# Patient Record
Sex: FEMALE | Race: WHITE | NOT HISPANIC OR LATINO | Employment: FULL TIME | URBAN - NONMETROPOLITAN AREA
[De-identification: names, ages, dates, MRNs, and addresses within clinical notes are randomized per-mention and may not be internally consistent; named-entity substitution may affect disease eponyms.]

---

## 2023-05-04 ENCOUNTER — APPOINTMENT (OUTPATIENT)
Dept: PRIMARY CARE | Facility: CLINIC | Age: 53
End: 2023-05-04
Payer: COMMERCIAL

## 2023-05-09 DIAGNOSIS — G43.919 INTRACTABLE MIGRAINE WITHOUT STATUS MIGRAINOSUS, UNSPECIFIED MIGRAINE TYPE: ICD-10-CM

## 2023-05-09 DIAGNOSIS — S46.912D STRAIN OF LEFT SHOULDER, SUBSEQUENT ENCOUNTER: ICD-10-CM

## 2023-05-09 RX ORDER — TOPIRAMATE 50 MG/1
50 TABLET, FILM COATED ORAL 2 TIMES DAILY
Qty: 180 TABLET | Refills: 3 | Status: SHIPPED | OUTPATIENT
Start: 2023-05-09 | End: 2023-06-09 | Stop reason: SDUPTHER

## 2023-05-09 RX ORDER — TOPIRAMATE 50 MG/1
50 TABLET, FILM COATED ORAL 2 TIMES DAILY
COMMUNITY
Start: 2020-01-30 | End: 2023-05-09 | Stop reason: SDUPTHER

## 2023-05-09 RX ORDER — MELOXICAM 15 MG/1
15 TABLET ORAL DAILY
Qty: 30 TABLET | Refills: 1 | Status: SHIPPED | OUTPATIENT
Start: 2023-05-09 | End: 2023-07-13 | Stop reason: SDUPTHER

## 2023-05-09 RX ORDER — MELOXICAM 15 MG/1
15 TABLET ORAL DAILY
COMMUNITY
Start: 2023-03-26 | End: 2023-05-09 | Stop reason: SDUPTHER

## 2023-05-23 RX ORDER — ASPIRIN 81 MG/81MG
81 CAPSULE ORAL
COMMUNITY

## 2023-05-23 RX ORDER — DICLOFENAC SODIUM 10 MG/G
4 GEL TOPICAL 4 TIMES DAILY PRN
COMMUNITY
Start: 2023-03-03

## 2023-05-23 RX ORDER — BUPROPION HYDROCHLORIDE 300 MG/1
300 TABLET ORAL DAILY
COMMUNITY
End: 2023-07-17 | Stop reason: SDUPTHER

## 2023-05-23 RX ORDER — ALBUTEROL SULFATE 90 UG/1
2 AEROSOL, METERED RESPIRATORY (INHALATION) EVERY 6 HOURS PRN
COMMUNITY

## 2023-05-23 RX ORDER — MONTELUKAST SODIUM 10 MG/1
10 TABLET ORAL NIGHTLY
COMMUNITY
End: 2023-10-23 | Stop reason: SDUPTHER

## 2023-05-23 RX ORDER — SUMATRIPTAN SUCCINATE 25 MG/1
25 TABLET ORAL
COMMUNITY
Start: 2021-05-05 | End: 2023-06-09

## 2023-05-23 RX ORDER — ONDANSETRON 4 MG/1
4 TABLET, FILM COATED ORAL 4 TIMES DAILY
COMMUNITY
Start: 2020-10-22

## 2023-05-23 RX ORDER — RILUZOLE 50 MG/1
50 TABLET, FILM COATED ORAL 2 TIMES DAILY
COMMUNITY

## 2023-05-23 RX ORDER — FLUOXETINE HYDROCHLORIDE 40 MG/1
40 CAPSULE ORAL DAILY
COMMUNITY
End: 2023-10-23 | Stop reason: SDUPTHER

## 2023-05-25 ENCOUNTER — APPOINTMENT (OUTPATIENT)
Dept: PRIMARY CARE | Facility: CLINIC | Age: 53
End: 2023-05-25
Payer: COMMERCIAL

## 2023-06-06 ENCOUNTER — TELEPHONE (OUTPATIENT)
Dept: PRIMARY CARE | Facility: CLINIC | Age: 53
End: 2023-06-06
Payer: COMMERCIAL

## 2023-06-06 NOTE — TELEPHONE ENCOUNTER
stating patient has been vomiting for the last 10 hours, shaking very bad. Asking for a call back.

## 2023-06-09 ENCOUNTER — TELEMEDICINE (OUTPATIENT)
Dept: PRIMARY CARE | Facility: CLINIC | Age: 53
End: 2023-06-09
Payer: COMMERCIAL

## 2023-06-09 DIAGNOSIS — G89.29 CHRONIC LEFT SHOULDER PAIN: Primary | ICD-10-CM

## 2023-06-09 DIAGNOSIS — J30.89 ENVIRONMENTAL AND SEASONAL ALLERGIES: ICD-10-CM

## 2023-06-09 DIAGNOSIS — G12.21 ALS (AMYOTROPHIC LATERAL SCLEROSIS) (MULTI): ICD-10-CM

## 2023-06-09 DIAGNOSIS — M25.512 CHRONIC LEFT SHOULDER PAIN: Primary | ICD-10-CM

## 2023-06-09 DIAGNOSIS — G43.919 INTRACTABLE MIGRAINE WITHOUT STATUS MIGRAINOSUS, UNSPECIFIED MIGRAINE TYPE: ICD-10-CM

## 2023-06-09 DIAGNOSIS — F32.A DEPRESSION, UNSPECIFIED DEPRESSION TYPE: ICD-10-CM

## 2023-06-09 DIAGNOSIS — F41.9 ANXIETY: ICD-10-CM

## 2023-06-09 PROCEDURE — 99443 PR PHYS/QHP TELEPHONE EVALUATION 21-30 MIN: CPT

## 2023-06-09 RX ORDER — TIZANIDINE 4 MG/1
4 TABLET ORAL EVERY 8 HOURS PRN
Qty: 90 TABLET | Refills: 2 | Status: SHIPPED | OUTPATIENT
Start: 2023-06-09 | End: 2023-10-23 | Stop reason: SDUPTHER

## 2023-06-09 RX ORDER — SUMATRIPTAN 50 MG/1
50 TABLET, FILM COATED ORAL ONCE AS NEEDED
Qty: 27 TABLET | Refills: 3 | Status: SHIPPED | OUTPATIENT
Start: 2023-06-09 | End: 2024-06-08

## 2023-06-09 RX ORDER — TOPIRAMATE 50 MG/1
150 TABLET, FILM COATED ORAL 2 TIMES DAILY
Qty: 540 TABLET | Refills: 3 | Status: SHIPPED | OUTPATIENT
Start: 2023-06-09 | End: 2024-06-08

## 2023-06-09 ASSESSMENT — ENCOUNTER SYMPTOMS
CARDIOVASCULAR NEGATIVE: 1
GASTROINTESTINAL NEGATIVE: 1
RESPIRATORY NEGATIVE: 1
CONSTITUTIONAL NEGATIVE: 1

## 2023-06-09 NOTE — PROGRESS NOTES
Subjective   Patient ID: Nusrat Christopher is a 53 y.o. female who presents via telephone for 2 mth ov.    HPI   ER visit 4 days ago for nausea/abdominal pain/vomiting. Symptoms slowly resolving now, she has Zofran prn.  MIGRAINES: Topomax 100mg daily no longer effective at preventing migraines, sumatriptan 25mg only mildly effective. She is getting 2-3 migraines weekly.  ALS: Recently diagnosed this year. Follows with Dr. Dong at [facility]. Next appointment in about 1 month. Started Rilutek 3 months.  ALLERGIES: Singulair effective, no SE.   DEPRESSION/ANXIETY: Moods okay given new dx of ALS, medications okay, no SE.  L SHOULDER PAIN: Taking tizanidine 4mg BID. Still with daily shoulder pain, difficult to move d/t pain, possible frozen shoulder per her son who is massage therapist. She has referral for PT, but difficult to get there d/t ALS, Dr. Dong is working on OT to come to her home, but is proving hard to find OT specific to ALS.      Review of Systems   Constitutional: Negative.    Respiratory: Negative.     Cardiovascular: Negative.    Gastrointestinal: Negative.        Objective   There were no vitals taken for this visit.    Physical Exam  Unable to perform d/t nature of visit.    Assessment/Plan        Increase Topamax to 150mg daily, increase sumatriptan to 50mg and then repeat in 2 hours prn.  Increase tizanidine to 4mg TID.  Advised to consult Dr. Dong in 1 month concerning migraines and shoulder pain in light of ALS.  We will follow up in little over 1 month after she sees Dr. Dong.

## 2023-06-27 ENCOUNTER — APPOINTMENT (OUTPATIENT)
Dept: PRIMARY CARE | Facility: CLINIC | Age: 53
End: 2023-06-27
Payer: COMMERCIAL

## 2023-07-13 DIAGNOSIS — R42 DIZZINESS: Primary | ICD-10-CM

## 2023-07-13 DIAGNOSIS — S46.912D STRAIN OF LEFT SHOULDER, SUBSEQUENT ENCOUNTER: ICD-10-CM

## 2023-07-13 RX ORDER — MECLIZINE HCL 12.5 MG 12.5 MG/1
12.5 TABLET ORAL 4 TIMES DAILY PRN
Qty: 120 TABLET | Refills: 1 | Status: SHIPPED | OUTPATIENT
Start: 2023-07-13 | End: 2023-09-11

## 2023-07-13 RX ORDER — MELOXICAM 15 MG/1
15 TABLET ORAL DAILY
Qty: 30 TABLET | Refills: 1 | Status: SHIPPED | OUTPATIENT
Start: 2023-07-13 | End: 2023-09-15 | Stop reason: SDUPTHER

## 2023-07-17 ENCOUNTER — TELEPHONE (OUTPATIENT)
Dept: PRIMARY CARE | Facility: CLINIC | Age: 53
End: 2023-07-17
Payer: COMMERCIAL

## 2023-07-17 DIAGNOSIS — F32.A DEPRESSION, UNSPECIFIED DEPRESSION TYPE: ICD-10-CM

## 2023-07-17 RX ORDER — BUPROPION HYDROCHLORIDE 300 MG/1
300 TABLET ORAL DAILY
Qty: 90 TABLET | Refills: 3 | Status: SHIPPED | OUTPATIENT
Start: 2023-07-17 | End: 2024-07-16

## 2023-07-17 NOTE — TELEPHONE ENCOUNTER
Pharmacy called and would like to know if the Bupropion needs to be IRMA or can they use a generic?

## 2023-09-15 DIAGNOSIS — S46.912D STRAIN OF LEFT SHOULDER, SUBSEQUENT ENCOUNTER: ICD-10-CM

## 2023-09-15 RX ORDER — MELOXICAM 15 MG/1
15 TABLET ORAL DAILY
Qty: 30 TABLET | Refills: 1 | Status: SHIPPED | OUTPATIENT
Start: 2023-09-15 | End: 2023-09-29 | Stop reason: SDUPTHER

## 2023-09-28 ENCOUNTER — TELEPHONE (OUTPATIENT)
Dept: PRIMARY CARE | Facility: CLINIC | Age: 53
End: 2023-09-28
Payer: COMMERCIAL

## 2023-09-28 NOTE — TELEPHONE ENCOUNTER
DM pharmacy needing clarification of meloxicam medication. Was written as generic but is marked as IRMA. Can call [phone]

## 2023-09-29 DIAGNOSIS — S46.912D STRAIN OF LEFT SHOULDER, SUBSEQUENT ENCOUNTER: ICD-10-CM

## 2023-09-29 RX ORDER — MELOXICAM 15 MG/1
15 TABLET ORAL DAILY
Qty: 30 TABLET | Refills: 2 | Status: SHIPPED | OUTPATIENT
Start: 2023-09-29 | End: 2023-12-28

## 2023-10-01 PROBLEM — R29.898 WEAKNESS OF LEFT ARM: Status: ACTIVE | Noted: 2023-10-01

## 2023-10-01 PROBLEM — R74.8 ELEVATED LIVER ENZYMES: Status: ACTIVE | Noted: 2023-10-01

## 2023-10-01 PROBLEM — J30.9 ALLERGIC RHINITIS: Status: ACTIVE | Noted: 2023-10-01

## 2023-10-01 PROBLEM — R47.81 DEFICIT IN COMMUNICATION DUE TO SLURRED SPEECH: Status: ACTIVE | Noted: 2023-10-01

## 2023-10-01 PROBLEM — K76.0 FATTY LIVER: Status: ACTIVE | Noted: 2023-10-01

## 2023-10-01 PROBLEM — G11.8 EPISODIC ATAXIA WITH SLURRED SPEECH (MULTI): Status: ACTIVE | Noted: 2023-10-01

## 2023-10-01 PROBLEM — R47.81 EPISODIC ATAXIA WITH SLURRED SPEECH (MULTI): Status: ACTIVE | Noted: 2023-10-01

## 2023-10-01 PROBLEM — G43.909 MIGRAINE: Status: ACTIVE | Noted: 2023-10-01

## 2023-10-01 PROBLEM — N32.81 OVERACTIVE BLADDER: Status: ACTIVE | Noted: 2023-10-01

## 2023-10-01 PROBLEM — E66.01 MORBID OBESITY (MULTI): Status: ACTIVE | Noted: 2023-10-01

## 2023-10-01 PROBLEM — R06.02 SHORTNESS OF BREATH: Status: ACTIVE | Noted: 2023-10-01

## 2023-10-01 PROBLEM — R51.9 MIXED HEADACHE: Status: ACTIVE | Noted: 2023-10-01

## 2023-10-01 PROBLEM — R13.10 DYSPHAGIA: Status: ACTIVE | Noted: 2023-10-01

## 2023-10-01 PROBLEM — E66.9 OBESITY, CLASS II, BMI 35-39.9: Status: ACTIVE | Noted: 2023-01-24

## 2023-10-01 PROBLEM — E66.9 OBESITY: Status: ACTIVE | Noted: 2023-10-01

## 2023-10-01 PROBLEM — J45.909 ASTHMA (HHS-HCC): Status: ACTIVE | Noted: 2023-10-01

## 2023-10-01 PROBLEM — E66.812 OBESITY, CLASS II, BMI 35-39.9: Status: ACTIVE | Noted: 2023-01-24

## 2023-10-01 PROBLEM — G47.33 OBSTRUCTIVE SLEEP APNEA: Status: ACTIVE | Noted: 2023-10-01

## 2023-10-01 PROBLEM — H69.90 EUSTACHIAN TUBE DYSFUNCTION: Status: ACTIVE | Noted: 2023-10-01

## 2023-10-01 RX ORDER — IBUPROFEN 600 MG/1
TABLET ORAL
COMMUNITY

## 2023-10-01 RX ORDER — CLINDAMYCIN HYDROCHLORIDE 150 MG/1
300 CAPSULE ORAL 4 TIMES DAILY
COMMUNITY

## 2023-10-01 RX ORDER — CETIRIZINE HYDROCHLORIDE 10 MG/1
10 TABLET ORAL
COMMUNITY

## 2023-10-01 RX ORDER — OMEPRAZOLE 20 MG/1
20 CAPSULE, DELAYED RELEASE ORAL
COMMUNITY

## 2023-10-01 RX ORDER — OXYCODONE AND ACETAMINOPHEN 5; 325 MG/1; MG/1
1 TABLET ORAL EVERY 6 HOURS
COMMUNITY
Start: 2023-06-24

## 2023-10-01 RX ORDER — CLOTRIMAZOLE AND BETAMETHASONE DIPROPIONATE 10; .64 MG/G; MG/G
1 CREAM TOPICAL 2 TIMES DAILY
COMMUNITY
Start: 2013-10-16

## 2023-10-01 RX ORDER — TIZANIDINE 2 MG/1
2 TABLET ORAL 2 TIMES DAILY
COMMUNITY
Start: 2023-04-27 | End: 2024-01-25 | Stop reason: ALTCHOICE

## 2023-10-01 RX ORDER — ONDANSETRON 4 MG/1
TABLET, ORALLY DISINTEGRATING ORAL
COMMUNITY
Start: 2023-06-06

## 2023-10-01 RX ORDER — KETOTIFEN FUMARATE 0.35 MG/ML
2 SOLUTION/ DROPS OPHTHALMIC
COMMUNITY

## 2023-10-01 RX ORDER — FLUOXETINE HYDROCHLORIDE 20 MG/1
20 CAPSULE ORAL DAILY
COMMUNITY
Start: 2023-02-06

## 2023-10-01 RX ORDER — ZOLPIDEM TARTRATE 5 MG/1
5 TABLET ORAL DAILY PRN
COMMUNITY
Start: 2013-10-23

## 2023-10-01 RX ORDER — SODIUM PHENYLBUTYRATE/TAURURSODIOL 3; 1 G/1; G/1
POWDER, FOR SUSPENSION ORAL
COMMUNITY
Start: 2023-04-13 | End: 2024-05-03

## 2023-10-01 RX ORDER — MULTIVITAMIN
TABLET ORAL
COMMUNITY

## 2023-10-01 RX ORDER — ONDANSETRON 8 MG/1
TABLET, ORALLY DISINTEGRATING ORAL
COMMUNITY
Start: 2023-06-24

## 2023-10-01 RX ORDER — TRAMADOL HYDROCHLORIDE 50 MG/1
50 TABLET ORAL EVERY 6 HOURS PRN
COMMUNITY

## 2023-10-01 RX ORDER — SUMATRIPTAN SUCCINATE 25 MG/1
TABLET ORAL
COMMUNITY

## 2023-10-03 ENCOUNTER — OFFICE VISIT (OUTPATIENT)
Dept: ORTHOPEDIC SURGERY | Facility: CLINIC | Age: 53
End: 2023-10-03
Payer: COMMERCIAL

## 2023-10-03 DIAGNOSIS — M25.512 BILATERAL SHOULDER PAIN, UNSPECIFIED CHRONICITY: Primary | ICD-10-CM

## 2023-10-03 DIAGNOSIS — R22.2 MASS OF CHEST WALL: ICD-10-CM

## 2023-10-03 DIAGNOSIS — M25.511 BILATERAL SHOULDER PAIN, UNSPECIFIED CHRONICITY: Primary | ICD-10-CM

## 2023-10-03 PROCEDURE — 99204 OFFICE O/P NEW MOD 45 MIN: CPT | Performed by: NURSE PRACTITIONER

## 2023-10-03 PROCEDURE — 1036F TOBACCO NON-USER: CPT | Performed by: NURSE PRACTITIONER

## 2023-10-03 ASSESSMENT — PAIN DESCRIPTION - DESCRIPTORS: DESCRIPTORS: ACHING

## 2023-10-03 ASSESSMENT — PAIN SCALES - GENERAL: PAINLEVEL_OUTOF10: 6

## 2023-10-03 ASSESSMENT — PAIN - FUNCTIONAL ASSESSMENT: PAIN_FUNCTIONAL_ASSESSMENT: 0-10

## 2023-10-03 NOTE — LETTER
October 5, 2023     Arley Draper PA-C  [address]  [facility], [facility] [address]  [place] [id number]    Patient: Nusrat Christopher   YOB: 1970   Date of Visit: 10/3/2023       Dear Dr. Arley Draper PA-C:    Thank you for referring Nusrat Christopher to me for evaluation. Below are my notes for this consultation.  If you have questions, please do not hesitate to call me. I look forward to following your patient along with you.       Sincerely,     Silvia Marshall, APRN-CNP      CC: No Recipients  ______________________________________________________________________________________    Subjective   Patient ID: Nusrat Christopher is a 53 y.o. female.    Chief Complaint: Immobility of the Left Shoulder and Immobility of the Right Shoulder    Left Shoulder     Right Shoulder         Nusrat is a pleasant 53-year-old female presenting as a new patient visit for evaluation of bilateral shoulder stiffness and concern for frozen shoulders.  Patient reports the left shoulder has been progressing since January of this year, initially the symptoms were attributed to her ALS and progression.  She has had no treatment.  Significant limited range of motion of the shoulder and all joints of the left upper arm.  The right shoulder is not as bad but is stiff with limited range of motion.  Patient is right-hand dominant.  No therapy for these conditions.  Patient did attempt some Voltaren gel to the left shoulder with no symptom improvement.  Patient is accompanied by her spouse and son-in-law.  Spouse assists with interpretation of speech as well as the primary caregiver at home.  Spouse is also disabled due to multiple orthopedic injuries.    Review of Systems   Constitutional:         Alert and pleasant, well-nourished female presenting today in wheelchair.  Patient does have difficulty with speech and noted atrophy of the left upper extremity.   Respiratory: Negative.     Cardiovascular: Negative.    Gastrointestinal:  Negative.    Musculoskeletal:  Positive for arthralgias, gait problem and myalgias.   Skin: Negative.    Neurological:         + for ALS    Psychiatric/Behavioral: Negative.            Objective  Right Shoulder Exam   Right shoulder exam is normal.    Range of Motion   Active abduction:  70   Forward flexion:  80     Muscle Strength   Right shoulder normal muscle strength: mild generalized weakness RUE.    Tests   Johnson test: negative  Impingement: negative  Drop arm: negative  Sulcus: absent    Other   Erythema: absent  Sensation: normal  Pulse: present      Left Shoulder Exam     Tenderness   Left shoulder tenderness location: generalized shoulder- anterior and superior aspects.    Range of Motion   Active abduction:  20   Forward flexion:  30     Muscle Strength   Left shoulder normal muscle strength: moderate LUE weakness, significant weakness at rotator cuff.    Other   Erythema: absent  Scars: absent  Sensation: normal  Pulse: present     Comments:  There is a palpable lump just distal to the AC joint to the lateral rib region approximately 2 inches x 2 inches.  This is firm and tender to touch, nonmobile.  Patient states this is the focal point of her pain.            Image Results:  Patient has not had shoulder imaging.  She was unaware of orders pending for today's visit.    Assessment/Plan  Encounter Diagnoses:    Mass of chest wall  Discussed findings of the lump near the left shoulder, I am referring to general surgery for further evaluation    Bilateral shoulder pain  We discussed topical diclofenac gel to bilateral shoulders, anticipate it should provide some relief to the right shoulder and may use up to 4 times daily.  We reviewed risk and benefits of cortisone injection.  What would serve for symptom control but also diagnostic.  I am recommending PT for muscle weakness.  Patient and spouse state they have financial concerns about being able to afford therapy.  During decision making process,  patient states she is going for a feeding tube next week.  At this time I do believe we should hold off on the cortisone injection until she is healing from that procedure.  Estimate about 2 to 3 weeks for follow-up.  I instructed patient family to have x-rays completed before that appointment and plan will be for cortisone injection of the right shoulder with ultrasound guidance here in the office.  Referring patient for general surgery eval of the left anterior chest lump, also to discuss with PCP.  Patient and family in agreement with plan of care.  This note was generated using Dragon software.  It may contain errors in wording, punctuation or spelling.     Mass of chest wall  Discussed findings of the lump near the left shoulder, I am referring to general surgery for further evaluation     Orders Placed This Encounter   • XR shoulder 2+ views bilateral   • Referral to General Surgery

## 2023-10-05 PROBLEM — R22.2 MASS OF CHEST WALL: Status: ACTIVE | Noted: 2023-10-05

## 2023-10-05 PROBLEM — M25.512 BILATERAL SHOULDER PAIN: Status: ACTIVE | Noted: 2023-10-05

## 2023-10-05 PROBLEM — M25.511 BILATERAL SHOULDER PAIN: Status: ACTIVE | Noted: 2023-10-05

## 2023-10-05 ASSESSMENT — ENCOUNTER SYMPTOMS
RESPIRATORY NEGATIVE: 1
CARDIOVASCULAR NEGATIVE: 1
GASTROINTESTINAL NEGATIVE: 1
MYALGIAS: 1
PSYCHIATRIC NEGATIVE: 1
ARTHRALGIAS: 1

## 2023-10-05 NOTE — ASSESSMENT & PLAN NOTE
Discussed findings of the lump near the left shoulder, I am referring to general surgery for further evaluation

## 2023-10-05 NOTE — PROGRESS NOTES
Subjective    Patient ID: Nusrat Christopher is a 53 y.o. female.    Chief Complaint: Immobility of the Left Shoulder and Immobility of the Right Shoulder    Left Shoulder     Right Shoulder         Nusrat is a pleasant 53-year-old female presenting as a new patient visit for evaluation of bilateral shoulder stiffness and concern for frozen shoulders.  Patient reports the left shoulder has been progressing since January of this year, initially the symptoms were attributed to her ALS and progression.  She has had no treatment.  Significant limited range of motion of the shoulder and all joints of the left upper arm.  The right shoulder is not as bad but is stiff with limited range of motion.  Patient is right-hand dominant.  No therapy for these conditions.  Patient did attempt some Voltaren gel to the left shoulder with no symptom improvement.  Patient is accompanied by her spouse and son-in-law.  Spouse assists with interpretation of speech as well as the primary caregiver at home.  Spouse is also disabled due to multiple orthopedic injuries.    Review of Systems   Constitutional:         Alert and pleasant, well-nourished female presenting today in wheelchair.  Patient does have difficulty with speech and noted atrophy of the left upper extremity.   Respiratory: Negative.     Cardiovascular: Negative.    Gastrointestinal: Negative.    Musculoskeletal:  Positive for arthralgias, gait problem and myalgias.   Skin: Negative.    Neurological:         + for ALS    Psychiatric/Behavioral: Negative.            Objective   Right Shoulder Exam   Right shoulder exam is normal.    Range of Motion   Active abduction:  70   Forward flexion:  80     Muscle Strength   Right shoulder normal muscle strength: mild generalized weakness RUE.    Tests   Johnson test: negative  Impingement: negative  Drop arm: negative  Sulcus: absent    Other   Erythema: absent  Sensation: normal  Pulse: present      Left Shoulder Exam     Tenderness   Left  shoulder tenderness location: generalized shoulder- anterior and superior aspects.    Range of Motion   Active abduction:  20   Forward flexion:  30     Muscle Strength   Left shoulder normal muscle strength: moderate LUE weakness, significant weakness at rotator cuff.    Other   Erythema: absent  Scars: absent  Sensation: normal  Pulse: present     Comments:  There is a palpable lump just distal to the AC joint to the lateral rib region approximately 2 inches x 2 inches.  This is firm and tender to touch, nonmobile.  Patient states this is the focal point of her pain.            Image Results:  Patient has not had shoulder imaging.  She was unaware of orders pending for today's visit.    Assessment/Plan   Encounter Diagnoses:    Mass of chest wall  Discussed findings of the lump near the left shoulder, I am referring to general surgery for further evaluation    Bilateral shoulder pain  We discussed topical diclofenac gel to bilateral shoulders, anticipate it should provide some relief to the right shoulder and may use up to 4 times daily.  We reviewed risk and benefits of cortisone injection.  What would serve for symptom control but also diagnostic.  I am recommending PT for muscle weakness.  Patient and spouse state they have financial concerns about being able to afford therapy.  During decision making process, patient states she is going for a feeding tube next week.  At this time I do believe we should hold off on the cortisone injection until she is healing from that procedure.  Estimate about 2 to 3 weeks for follow-up.  I instructed patient family to have x-rays completed before that appointment and plan will be for cortisone injection of the right shoulder with ultrasound guidance here in the office.  Referring patient for general surgery eval of the left anterior chest lump, also to discuss with PCP.  Patient and family in agreement with plan of care.  This note was generated using Dragon software.  It  may contain errors in wording, punctuation or spelling.     Mass of chest wall  Discussed findings of the lump near the left shoulder, I am referring to general surgery for further evaluation     Orders Placed This Encounter    XR shoulder 2+ views bilateral    Referral to General Surgery

## 2023-10-05 NOTE — ASSESSMENT & PLAN NOTE
We discussed topical diclofenac gel to bilateral shoulders, anticipate it should provide some relief to the right shoulder and may use up to 4 times daily.  We reviewed risk and benefits of cortisone injection.  What would serve for symptom control but also diagnostic.  I am recommending PT for muscle weakness.  Patient and spouse state they have financial concerns about being able to afford therapy.  During decision making process, patient states she is going for a feeding tube next week.  At this time I do believe we should hold off on the cortisone injection until she is healing from that procedure.  Estimate about 2 to 3 weeks for follow-up.  I instructed patient family to have x-rays completed before that appointment and plan will be for cortisone injection of the right shoulder with ultrasound guidance here in the office.  Referring patient for general surgery eval of the left anterior chest lump, also to discuss with PCP.  Patient and family in agreement with plan of care.  This note was generated using Dragon software.  It may contain errors in wording, punctuation or spelling.

## 2023-10-08 ENCOUNTER — HOSPITAL ENCOUNTER (OUTPATIENT)
Dept: RADIOLOGY | Facility: HOSPITAL | Age: 53
Discharge: HOME | End: 2023-10-08
Payer: COMMERCIAL

## 2023-10-08 DIAGNOSIS — M25.511 BILATERAL SHOULDER PAIN, UNSPECIFIED CHRONICITY: ICD-10-CM

## 2023-10-08 DIAGNOSIS — M25.512 BILATERAL SHOULDER PAIN, UNSPECIFIED CHRONICITY: ICD-10-CM

## 2023-10-08 PROCEDURE — 73030 X-RAY EXAM OF SHOULDER: CPT | Mod: BILATERAL PROCEDURE

## 2023-10-08 PROCEDURE — 73030 X-RAY EXAM OF SHOULDER: CPT | Mod: 50

## 2023-10-16 DIAGNOSIS — R21 RASH: Primary | ICD-10-CM

## 2023-10-16 RX ORDER — PREDNISONE 20 MG/1
TABLET ORAL
Qty: 18 TABLET | Refills: 0 | Status: SHIPPED | OUTPATIENT
Start: 2023-10-16

## 2023-10-17 ENCOUNTER — OFFICE VISIT (OUTPATIENT)
Dept: ORTHOPEDIC SURGERY | Facility: CLINIC | Age: 53
End: 2023-10-17
Payer: MEDICARE

## 2023-10-17 VITALS — TEMPERATURE: 97.8 F | WEIGHT: 211 LBS | BODY MASS INDEX: 38.59 KG/M2

## 2023-10-17 DIAGNOSIS — M25.511 BILATERAL SHOULDER PAIN, UNSPECIFIED CHRONICITY: Primary | ICD-10-CM

## 2023-10-17 DIAGNOSIS — G12.21 ALS (AMYOTROPHIC LATERAL SCLEROSIS) (MULTI): ICD-10-CM

## 2023-10-17 DIAGNOSIS — M75.02 ADHESIVE CAPSULITIS OF BOTH SHOULDERS: ICD-10-CM

## 2023-10-17 DIAGNOSIS — M25.512 BILATERAL SHOULDER PAIN, UNSPECIFIED CHRONICITY: Primary | ICD-10-CM

## 2023-10-17 DIAGNOSIS — M75.01 ADHESIVE CAPSULITIS OF BOTH SHOULDERS: ICD-10-CM

## 2023-10-17 PROCEDURE — 1036F TOBACCO NON-USER: CPT | Performed by: NURSE PRACTITIONER

## 2023-10-17 PROCEDURE — 99214 OFFICE O/P EST MOD 30 MIN: CPT | Performed by: NURSE PRACTITIONER

## 2023-10-17 ASSESSMENT — PAIN - FUNCTIONAL ASSESSMENT: PAIN_FUNCTIONAL_ASSESSMENT: 0-10

## 2023-10-17 ASSESSMENT — PAIN SCALES - GENERAL: PAINLEVEL_OUTOF10: 5 - MODERATE PAIN

## 2023-10-17 ASSESSMENT — PAIN DESCRIPTION - DESCRIPTORS: DESCRIPTORS: ACHING

## 2023-10-17 NOTE — PROGRESS NOTES
Subjective    Patient ID: Nusrat Christopher is a 53 y.o. female.    Chief Complaint: Pain of the Left Shoulder  Pt c/o bilat shoulder pain    HPI  Nusrat is a pleasant 53-year-old female presenting as a FUV for evaluation of bilateral shoulder stiffness and concern for frozen shoulders.  Patient reports the left shoulder has been progressing since January of this year, initially the symptoms were attributed to her ALS and progression.  She has had no treatment.  Significant limited range of motion of the shoulder and all joints of the left upper arm.  The right shoulder is not as bad but is stiff with limited range of motion.  Patient is right-hand dominant. Patient is accompanied by her spouse and son-in-law.  Spouse assists with interpretation of speech as well as the primary caregiver at home.     Review of Systems   Constitutional:         Alert and pleasant, well-nourished female presenting today in wheelchair.  Patient does have difficulty with speech and noted atrophy of the left upper extremity.   Respiratory: Negative.     Cardiovascular: Negative.    Gastrointestinal: Negative.    Musculoskeletal:  Positive for arthralgias, gait problem and myalgias.   Skin: Negative.    Neurological:         + for ALS    Psychiatric/Behavioral: Negative.          Objective   Ortho Exam  Left Shoulder Exam      Tenderness   Left shoulder tenderness location: generalized shoulder- anterior and superior aspects.     Range of Motion   Active abduction:  20   Forward flexion:  30      Muscle Strength   Left shoulder normal muscle strength: moderate LUE weakness, significant weakness at rotator cuff.     Other   Erythema: absent  Scars: absent  Sensation: normal  Pulse: present      Comments:  There is a palpable lump just distal to the AC joint to the lateral rib region approximately 2 inches x 2 inches.  This is firm and tender to touch, nonmobile.  Patient states this is the focal point of her pain.    Image Results:  XR shoulder  2+ views bilateral  Narrative: Interpreted By:  Marisa Guido,   STUDY:  XR SHOULDER 2+ VIEWS BILATERAL; ;  10/8/2023 10:19 am      INDICATION:  Signs/Symptoms:frozen shoulder bilat.      COMPARISON:  None.      ACCESSION NUMBER(S):  [id number]      ORDERING CLINICIAN:  SLIME BOND      FINDINGS:  AP, scapular Y, internal and external rotation and axillary views  were obtained. No fracture or dislocation is noted in either  shoulder. Glenohumeral joints appear intact with minimal hypertrophy  along the inferior aspect of the left glenoid process. There is  minimal hypertrophy at the AC joints. Subacromial spaces are  preserved. There is no tendon calcification.      Impression: No acute osseous abnormality      Minimal degenerative change          MACRO:  None      Signed by: Marisa Guido 10/11/2023 8:02 AM  Dictation workstation:   [id number]    Reviewed bilateral shoulder imaging during today's visit, minimum degenerative changes at the AC joint.  There is no obvious fracture, concerns for alignment or obvious degenerative changes noted.  Patient ID: Nusrat Christopher is a 53 y.o. female.    L Inj/Asp (Bilat injections performed) on 10/22/2023 2:50 PM  Indications: pain, joint swelling and diagnostic evaluation  Details: 22 G needle, ultrasound-guided posterior approach  Medications: 40 mg triamcinolone acetonide 40 mg/mL (x2)  Outcome: tolerated well, no immediate complications    We discussed risk and benefits of cortisone injection, patient wishes to proceed via verbal consent.  Skin was prepped with Betadine, Vapocoolant spray and alcohol.  Direct visualization using high-frequency linear probe of ultrasound was utilized to administer the injection of 40 mg Kenalog, 3 cc 1% lidocaine and 3 cc of bupivacaine to L and R subacromial spaces.  Patient tolerated injection well lidocaine suppression.  Images were saved under MRN number into the PACS system.   Consent was given by the patient. Immediately  prior to procedure a time out was called to verify the correct patient, procedure, equipment, support staff and site/side marked as required. Patient was prepped and draped in the usual sterile fashion.           Assessment/Plan   Encounter Diagnoses:  Adhesive capsulitis of both shoulders  We reviewed OTC Tylenol per package directions, topical diclofenac gel 4 times daily.  I encouraged daily range of motion and advance as tolerated.  I highly recommend formal PT for preservation of range of motion and strength.  Patient is pending new insurance, order will be placed.  Patient instructed to keep follow-up with general surgery regarding right anterior chest wall mass as scheduled.  Plan will be to follow-up here for the shoulder stiffness and tightness in approximately 2 to 3 months, sooner for changes or concerns.  Patient and family in agreement with plan of care.  This note was generated using Dragon software.  It may contain errors in wording, punctuation or spelling.

## 2023-10-22 PROCEDURE — 20611 DRAIN/INJ JOINT/BURSA W/US: CPT | Performed by: NURSE PRACTITIONER

## 2023-10-22 RX ORDER — TRIAMCINOLONE ACETONIDE 40 MG/ML
40 INJECTION, SUSPENSION INTRA-ARTICULAR; INTRAMUSCULAR
Status: COMPLETED | OUTPATIENT
Start: 2023-10-22 | End: 2023-10-22

## 2023-10-22 RX ADMIN — TRIAMCINOLONE ACETONIDE 40 MG: 40 INJECTION, SUSPENSION INTRA-ARTICULAR; INTRAMUSCULAR at 14:50

## 2023-10-22 NOTE — ASSESSMENT & PLAN NOTE
We reviewed OTC Tylenol per package directions, topical diclofenac gel 4 times daily.  I encouraged daily range of motion and advance as tolerated.  I highly recommend formal PT for preservation of range of motion and strength.  Patient is pending new insurance, order will be placed.  Patient instructed to keep follow-up with general surgery regarding right anterior chest wall mass as scheduled.  Plan will be to follow-up here for the shoulder stiffness and tightness in approximately 2 to 3 months, sooner for changes or concerns.  Patient and family in agreement with plan of care.  This note was generated using Dragon software.  It may contain errors in wording, punctuation or spelling.

## 2023-10-23 DIAGNOSIS — J45.909 ASTHMA, UNSPECIFIED ASTHMA SEVERITY, UNSPECIFIED WHETHER COMPLICATED, UNSPECIFIED WHETHER PERSISTENT (HHS-HCC): ICD-10-CM

## 2023-10-23 DIAGNOSIS — J30.9 ALLERGIC RHINITIS, UNSPECIFIED SEASONALITY, UNSPECIFIED TRIGGER: ICD-10-CM

## 2023-10-23 DIAGNOSIS — M25.512 CHRONIC LEFT SHOULDER PAIN: ICD-10-CM

## 2023-10-23 DIAGNOSIS — G89.29 CHRONIC LEFT SHOULDER PAIN: ICD-10-CM

## 2023-10-23 DIAGNOSIS — F32.A DEPRESSION, UNSPECIFIED DEPRESSION TYPE: ICD-10-CM

## 2023-10-23 RX ORDER — TIZANIDINE 4 MG/1
4 TABLET ORAL EVERY 8 HOURS PRN
Qty: 90 TABLET | Refills: 2 | Status: SHIPPED | OUTPATIENT
Start: 2023-10-23 | End: 2024-01-25 | Stop reason: ALTCHOICE

## 2023-10-23 RX ORDER — FLUOXETINE HYDROCHLORIDE 40 MG/1
40 CAPSULE ORAL DAILY
Qty: 90 CAPSULE | Refills: 3 | Status: SHIPPED | OUTPATIENT
Start: 2023-10-23 | End: 2024-10-22

## 2023-10-23 RX ORDER — MONTELUKAST SODIUM 10 MG/1
10 TABLET ORAL NIGHTLY
Qty: 90 TABLET | Refills: 3 | Status: SHIPPED | OUTPATIENT
Start: 2023-10-23 | End: 2024-10-22

## 2023-10-30 ENCOUNTER — OFFICE VISIT (OUTPATIENT)
Dept: SURGERY | Facility: CLINIC | Age: 53
End: 2023-10-30
Payer: MEDICARE

## 2023-10-30 VITALS
SYSTOLIC BLOOD PRESSURE: 128 MMHG | HEART RATE: 96 BPM | BODY MASS INDEX: 30.4 KG/M2 | DIASTOLIC BLOOD PRESSURE: 94 MMHG | HEIGHT: 62 IN | WEIGHT: 165.2 LBS

## 2023-10-30 DIAGNOSIS — R22.2 MASS OF CHEST WALL: ICD-10-CM

## 2023-10-30 DIAGNOSIS — M79.89 SOFT TISSUE MASS: Primary | ICD-10-CM

## 2023-10-30 PROCEDURE — 99203 OFFICE O/P NEW LOW 30 MIN: CPT | Performed by: SURGERY

## 2023-10-30 PROCEDURE — 1036F TOBACCO NON-USER: CPT | Performed by: SURGERY

## 2023-10-30 NOTE — PROGRESS NOTES
General Surgery Consultation    Patient: Nusrat Christopher  : 1970  MRN: [id number]  Date of Consultation: 10/30/23    Primary Care Provider: Arley Draper PA-C  Referring Provider: SONIA Gardiner    Chief Complaint: Soft tissue mass    History of Present Illness: Nusrat Christopher is a 53 y.o. old female seen at the request of SONIA Gardiner, for evaluation of a soft tissue mass overlying her left anterior chest wall.  The patient has ALS.  She has recently been having restricted mobility in the left shoulder.  She was evaluated at [facility] where she gets her care for the ALS.  They thought that this was unlikely related to ALS and referred her to Orthopedic Surgery.  When she was evaluated in the Orthopedic office, she was thought to have a soft tissue mass on the left anterior chest wall.  According to the family member who is accompanying her today, this was below the clavicle and medial to the shoulder itself.  The patient has never noticed a bump or soft tissue mass in this region.  She denies any swelling, infection, skin changes, or drainage in this area.  She has never had any soft tissue lesions removed in the past.    Medical History:  ALS  Frozen Shoulder  Anxiety/Depression  Asthma  Chronic migraines  Previous COVID-19 infection  Sleep apnea  Obesity, BMI 38.6    Surgical History:  PEG 10/9/23  Laparoscopic cholecystectomy May 2013  Hysterectomy Oct 2013    Home Medications:  Prior to Admission medications    Medication Sig Start Date End Date Taking? Authorizing Provider   albuterol 90 mcg/actuation inhaler Inhale 2 puffs every 6 hours if needed for wheezing.    Historical Provider, MD   aspirin ([place]) 81 mg capsule Take 81 mg by mouth once daily.    Historical Provider, MD   buPROPion XL (Wellbutrin XL) 300 mg 24 hr tablet Take 1 tablet (300 mg) by mouth once daily. 23  Arley Draper PA-C   calcium carbonate EX (Tums Extra Strength) 300 mg (750 mg) chewable tablet  Chew.    Historical Provider, MD   cetirizine (ZyrTEC) 10 mg tablet Take 1 tablet (10 mg) by mouth once daily.    Historical Provider, MD   clindamycin (Cleocin) 150 mg capsule Take 2 capsules (300 mg) by mouth 4 times a day.    Historical Provider, MD   clotrimazole-betamethasone (Lotrisone) cream Apply 1 Application topically twice a day. 10/16/13   Historical Provider, MD   diclofenac sodium (Voltaren) 1 % gel gel Apply 1 Application topically 4 times a day as needed. 3/3/23   Historical Provider, MD   FLUoxetine (PROzac) 20 mg capsule Take 1 capsule (20 mg) by mouth once daily. 2/6/23   Historical Provider, MD   FLUoxetine (PROzac) 40 mg capsule Take 1 capsule (40 mg) by mouth once daily. 10/23/23 10/22/24  Arley Draper PA-C   ibuprofen 600 mg tablet TAKE TABLET  PRN    Historical Provider, MD   ketotifen (Zaditor) 0.025 % (0.035 %) ophthalmic solution Administer 2 drops into affected eye(s) once daily.    Historical Provider, MD   meloxicam (Mobic) 15 mg tablet Take 1 tablet (15 mg) by mouth once daily. 9/29/23 12/28/23  Arley Draper PA-C   montelukast (Singulair) 10 mg tablet Take 1 tablet (10 mg) by mouth once daily at bedtime. 10/23/23 10/22/24  Arley Draper PA-C   multivitamin tablet Take by mouth.    Historical Provider, MD   omeprazole (PriLOSEC) 20 mg DR capsule Take 1 capsule (20 mg) by mouth once daily.    Historical Provider, MD   ondansetron (Zofran) 4 mg tablet Take 1 tablet (4 mg) by mouth 4 times a day. 10/22/20   Historical Provider, MD   ondansetron ODT (Zofran-ODT) 4 mg disintegrating tablet DISSOLVE ONE TABLET BY MOUTH THREE TIMES DAILY 6/6/23   Historical Provider, MD   ondansetron ODT (Zofran-ODT) 8 mg disintegrating tablet dissolve 1 (ONE) tablet in the mouth every 6 (SIX) hours 6/24/23   Historical Provider, MD   oxyCODONE-acetaminophen (Percocet) 5-325 mg tablet Take 1 tablet by mouth every 6 hours. 6/24/23   Historical Provider, MD   predniSONE (Deltasone) 20 mg tablet Take 3 tabs  (60mg) daily for 3 days, then take 2 tabs (40mg) daily for 3 days, then take 1 tab (20mg) daily for 3 days. 10/16/23   Arley Draper PA-C   riluzole (Rilutek) 50 mg tablet Take 1 tablet (50 mg) by mouth 2 times a day.    Historical Provider, MD   sod phenylbutyrat-taurursodiol (Relyvrio) 3-1 gram powder in packet Take 1 packet by mouth daily for 21 days, THEN 1 packet 2 times daily. 4/13/23 5/3/24  Historical Provider, MD   SUMAtriptan (Imitrex) 25 mg tablet 1 tab(s) orally once a day, As Needed for migraine-may repeat dose once in 2 hours if needed    Historical Provider, MD   SUMAtriptan (Imitrex) 50 mg tablet Take 1 tablet (50 mg) by mouth 1 time if needed for migraine. May repeat after 2 hours. 6/9/23 6/8/24  Arley Draper PA-C   tiZANidine (Zanaflex) 2 mg tablet Take 1 tablet (2 mg) by mouth 2 times a day. 4/27/23   Historical Provider, MD   tiZANidine (Zanaflex) 4 mg tablet Take 1 tablet (4 mg) by mouth every 8 hours if needed for muscle spasms. 10/23/23 1/21/24  Arley Draper PA-C   topiramate (Topamax) 50 mg tablet Take 3 tablets (150 mg) by mouth 2 times a day. 6/9/23 6/8/24  Arley Draper PA-C   traMADol (Ultram) 50 mg tablet Take 1 tablet (50 mg) by mouth every 6 hours if needed.    Historical Provider, MD   zolpidem (Ambien) 5 mg tablet Take 1 tablet (5 mg) by mouth once daily as needed for sleep. 10/23/13   Historical Provider, MD     Allergies:  Allergies   Allergen Reactions    Horse Dander Anaphylaxis    Hydrocodone Anaphylaxis    Hydrocodone-Acetaminophen Anaphylaxis    Penicillins Anaphylaxis, Hives and Swelling    Shellfish Containing Products Anaphylaxis    Shellfish Derived Anaphylaxis    Animal Dander Unknown    Horse Dander Standardized Allergenic Extract Unknown     Family History:   No family history of soft tissue malignancy.  Father with Crohn's disease.  Mother and sister with thyroid disease.  Both parents with heart disease.    Social History:  Former smoker. No alcohol or drug  "use.    ROS:  Constitutional:  no fever, sweats, and chills  Cardiovascular: No chest pain  Respiratory: No cough or shortness of breath  Gastrointestinal: No abdominal pain  Genitourinary: + History of kidney stones  Psychiatric: + Anxiety, depression, difficulty sleeping, claustrophobia  Musculoskeletal: no weakness or swelling  Integumentary: + Soft tissue mass overlying left shoulder, rashes  Neurological: no confusion  Endocrine: no heat or cold intolerance  Heme/Lymph: no easy bruising or bleeding    Objective:  BP (!) 128/94   Pulse 96   Ht 1.575 m (5' 2\")   Wt 74.9 kg (165 lb 3.2 oz)   BMI 30.22 kg/m²     Physical Exam:  Constitutional: No acute distress, conversant, pleasant  Neurologic: alert and oriented  Psych: appropriate affect  Ears, Nose, Mouth and Throat: mucus membranes moist  Pulmonary: No labored breathing  Cardiovascular: Regular rate and rhythm  Abdomen: non-distended, BMI 38.6, PEG tube  Musculoskeletal: Restricted mobility in the left shoulder, she has no visible bulges or soft tissue masses in the area of interest around the left shoulder/chest wall.  She has no bulges or soft tissue masses on the right side either.  I am unable to palpate any abnormal soft tissue masses in this area.  I do not feel any enlarged lymph nodes in the supraclavicular region or axilla.  I am unable to appreciate the soft tissue mass previously reported.  Skin: No jaundice    Labs/Imaging:   No pertinent labs or imaging available for review.    Assessment and Plan: Nusrat Christopher is a 53 y.o. old female with question of a soft tissue mass of the left anterior chest wall.  I did not appreciate any abnormalities on physical exam.  However, patient's family has some anxiety about this being that they were told that there was a mass there previously.  We will therefore order a CT scan of the chest for further evaluation.  With the patient's permission, I will call her daughter, Jannette ([phone]) with the result " of the scan, and see the patient in the office if CT scan findings warrant follow-up.    Zandra Anaya MD  10/30/2023

## 2023-11-02 ENCOUNTER — HOSPITAL ENCOUNTER (OUTPATIENT)
Dept: RADIOLOGY | Facility: HOSPITAL | Age: 53
Discharge: HOME | End: 2023-11-02
Payer: MEDICARE

## 2023-11-02 DIAGNOSIS — M79.89 SOFT TISSUE MASS: ICD-10-CM

## 2023-11-02 PROCEDURE — 71260 CT THORAX DX C+: CPT

## 2023-11-02 PROCEDURE — 2550000001 HC RX 255 CONTRASTS: Performed by: SURGERY

## 2023-11-02 PROCEDURE — 71260 CT THORAX DX C+: CPT | Performed by: RADIOLOGY

## 2023-11-02 RX ADMIN — IOHEXOL 50 ML: 350 INJECTION, SOLUTION INTRAVENOUS at 18:48

## 2023-11-07 ENCOUNTER — TELEPHONE (OUTPATIENT)
Dept: SURGERY | Facility: CLINIC | Age: 53
End: 2023-11-07
Payer: COMMERCIAL

## 2023-11-07 NOTE — TELEPHONE ENCOUNTER
I spoke with the patient's daughter over the phone to review CT scan of the chest.  There is no soft tissue mass or abnormality seen.  She will follow-up as needed.    Zandra Anaya MD  11/07/23  8:37 AM

## 2023-11-30 ENCOUNTER — ANCILLARY PROCEDURE (OUTPATIENT)
Dept: RADIOLOGY | Facility: CLINIC | Age: 53
End: 2023-11-30
Payer: MEDICARE

## 2023-11-30 ENCOUNTER — OFFICE VISIT (OUTPATIENT)
Dept: ORTHOPEDIC SURGERY | Facility: CLINIC | Age: 53
End: 2023-11-30
Payer: MEDICARE

## 2023-11-30 DIAGNOSIS — M75.01 ADHESIVE CAPSULITIS OF BOTH SHOULDERS: ICD-10-CM

## 2023-11-30 DIAGNOSIS — M75.02 ADHESIVE CAPSULITIS OF BOTH SHOULDERS: ICD-10-CM

## 2023-11-30 DIAGNOSIS — M75.02 ADHESIVE CAPSULITIS OF LEFT SHOULDER: ICD-10-CM

## 2023-11-30 PROCEDURE — 73030 X-RAY EXAM OF SHOULDER: CPT | Mod: LEFT SIDE | Performed by: RADIOLOGY

## 2023-11-30 PROCEDURE — 73030 X-RAY EXAM OF SHOULDER: CPT | Mod: LT,FY

## 2023-11-30 PROCEDURE — 99214 OFFICE O/P EST MOD 30 MIN: CPT | Performed by: SPECIALIST

## 2023-11-30 PROCEDURE — 1036F TOBACCO NON-USER: CPT | Performed by: SPECIALIST

## 2023-11-30 PROCEDURE — 76882 US LMTD JT/FCL EVL NVASC XTR: CPT | Performed by: SPECIALIST

## 2023-11-30 ASSESSMENT — PAIN DESCRIPTION - DESCRIPTORS: DESCRIPTORS: ACHING

## 2023-11-30 ASSESSMENT — PAIN SCALES - GENERAL: PAINLEVEL_OUTOF10: 4

## 2023-11-30 ASSESSMENT — PAIN - FUNCTIONAL ASSESSMENT: PAIN_FUNCTIONAL_ASSESSMENT: 0-10

## 2023-12-02 PROBLEM — M24.612 ARTHROFIBROSIS OF LEFT SHOULDER: Status: ACTIVE | Noted: 2023-06-09

## 2023-12-02 NOTE — PROGRESS NOTES
Assessment/Plan   Encounter Diagnoses:  Adhesive capsulitis of left shoulder    Adhesive capsulitis of both shoulders  Adhesive capsulitis of left shoulder      Assessment: Adhesive capsulitis of the left shoulder complicated by ALS    Plan:    Continue at home physical therapy  Voltaren gel as needed  Icing or moist heat for pain relief  Follow-up in 6 weeks for reevaluation  We might consider an intra-articular injection  I discussed the risks and benefits of manipulative release of her contracture which would require anesthesia.       Subjective    Patient ID: Nusrat Christopher is a 53 y.o. female.    Chief Complaint: Follow-up of the Left Shoulder (FROZEN SHOULDER/PAIN RATE 4/X-RAY 11-30-23)     Last Surgery: No surgery found  Last Surgery Date: No surgery found    HPI  53-year-old with ALS who is also complaining of left shoulder pain.  She has adhesive capsulitis.  She is getting therapy at home and feels that they are helping her mobilize her shoulder.  Her  who is also my patient is her primary caregiver.  She is requiring feeding tubes and has difficulties with ambulation.    OBJECTIVE: ORTHO EXAM  Left shoulder:  Inspection:  Skin healthy to gross inspection  No ecchymosis, no edema, no gross atrophy    Palpation:  Acromioclavicular joint no tenderness   Biceps tendon/ groove no tenderness  Anterior Acromial Bursal Area moderate tenderness  Cervical spine minimal tenderness     ROM:  Forward Flexion 45 degrees active  External Rotation 60 degrees at 90 degrees abduction  Internal Rotation 40  degrees at 90 degrees abduction    Strength:  4/5 Supraspinatus isolation- resisted elevation  4/5 Infraspinatus isolation- ER  5 -/5 Subscapularis- IR     Negative lift off test   Negative Spurling´s test  Positive Neer and Hawking´s test  Negative Speed's test  Negative Inferior Sulcus  Negative Anterior Apprehension    Full unrestricted motion at Elbow/Wrist/Hand  Neurovascular exam normal distally        IMAGE  RESULTS:  XR shoulder left 2+ views  Narrative: Interpreted By:  Charisma Fung,   STUDY:  Left shoulder, 2 views.      INDICATION:  Signs/Symptoms:LEFT FROZEN SHOULDER.      COMPARISON:  10/08/2023.      ACCESSION NUMBER(S):  [id number]      ORDERING CLINICIAN:  MIKE EAGLE      FINDINGS:  Limited study as the neutral view is limited by technique.  No acute fracture or malalignment within this limitation.  Glenohumeral joint space is not well assessed.      Impression: 1. Limited study with findings as above.      MACRO:      None.      Signed by: Charisma Fung 12/1/2023 4:45 PM  Dictation workstation:   [id number]      ULTRASOUND  DIAGNOSTIC ULTRASOUND FINAL REPORT: Left SHOULDER  Provider: Mike Eagle MD  Date of Exam: Today  Procedure: Ultrasound, extremity, nonvascular, real-time, COMPLETE, anatomic specific.  Site:   SHOULDER  Indication:  SHOULDER PAIN  Technique: B-Mode Ultrasound Examination performed using 8-13 MHz linear transducer with [employer]  STUDY TYPE:   1. ULTRASOUND EXTREMITY INCLUDING BUT NOT LIMITED TO SHOULDER MUSCLE, TENDONS, LIGAMENTS, FATTY TISSUES, SUBCUTANEOUS TISSUES AND OTHER SOFT TISSUE STRUCTURES SUCH AS ABSCESSES OR FREE FLUID ACCUMULATION WITHIN THE PRIMARY JOINT AS WELL AS ADJACENT JOINTS.  2. REAL TIME WITH IMAGE DOCUMENTATION  3. NON-VASCULAR  4. COMPLETE STUDY WHICH INCLUDES A THOROUGH EVALUATION OF THE SHOULDER MUSCLE, TENDONS, LIGAMENTS, FATTY TISSUES, SUBCUTANEOUS TISSUES AND OTHER SOFT TISSUE STRUCTURES SUCH AS ABSCESSES OR FREE FLUID ACCUMULATION WITHIN THE PRIMARY JOINT AS WELL AS ADJACENT JOINTS.  Live ultrasound was performed of the patient´s  SHOULDER and PERMANENTLY documented.  This is a thorough and complete evaluation of a specific anatomic region specifically the  SHOULDER.  PERMANENT Image documentation was performed.  This is the complete and final ultrasound report of the patient's  SHOULDER.  The patient was positioned in order to optimize the  ultrasound evaluation of the  SHOULDER.  Ultrasound gel was used as a conductive medium in order to both transmit and receive ultrasonic signals that characterize the soft tissues. . I personally performed the ultrasound and reviewed the findings. These show:  Findings:  SHOULDER  Rosalina-articular evaluation: Live ultrasound was performed of the patient's  SHOULDER that shows tendinosis appearance of the supraspinatus and infraspinatus tendons with the deltoid muscle fibers showing normal striations.  There was minimal sub acromial effusion.  Evaluation of the subscapularis with the arm in external rotation showed decreased range of motion making the evaluation more difficult.   Evaluation: The biceps tendon was visualized within the bicipital groove.      Procedures     Orders Placed This Encounter    Point of Care Ultrasound    Point of Care Ultrasound    XR shoulder left 2+ views

## 2023-12-02 NOTE — ASSESSMENT & PLAN NOTE
Assessment: Adhesive capsulitis of the left shoulder complicated by ALS    Plan:    Continue at home physical therapy  Voltaren gel as needed  Icing or moist heat for pain relief  Follow-up in 6 weeks for reevaluation  We might consider an intra-articular injection  I discussed the risks and benefits of manipulative release of her contracture which would require anesthesia.

## 2023-12-22 ENCOUNTER — TELEPHONE (OUTPATIENT)
Dept: PRIMARY CARE | Facility: CLINIC | Age: 53
End: 2023-12-22
Payer: COMMERCIAL

## 2023-12-22 NOTE — TELEPHONE ENCOUNTER
DANILO ODONNELL FROM [facility] CALLED TO TALK TO YOU ABOUT EDITA. PLEASE CALL DANILO BACK [phone]. THANK YOU.

## 2023-12-22 NOTE — TELEPHONE ENCOUNTER
I spoke to pt , he stated he will keep a close eye on her and if symptoms worsen will take her to ER

## 2023-12-22 NOTE — TELEPHONE ENCOUNTER
I spoke to Sari home health nurse- she saw Nusrat today, less air exchange from the left lower lobe, pt c/o no appetite, fatigue, some SOB yesterday and some coughing. NO HA, fever,diarrhea or vomiting noted    Sari is concerned she might have pneumonia   please advise

## 2023-12-29 ENCOUNTER — APPOINTMENT (OUTPATIENT)
Dept: CARDIOLOGY | Facility: HOSPITAL | Age: 53
End: 2023-12-29
Payer: COMMERCIAL

## 2023-12-29 ENCOUNTER — APPOINTMENT (OUTPATIENT)
Dept: RADIOLOGY | Facility: HOSPITAL | Age: 53
End: 2023-12-29
Payer: COMMERCIAL

## 2023-12-29 ENCOUNTER — HOSPITAL ENCOUNTER (EMERGENCY)
Facility: HOSPITAL | Age: 53
Discharge: HOME | End: 2023-12-29
Attending: EMERGENCY MEDICINE
Payer: COMMERCIAL

## 2023-12-29 VITALS
RESPIRATION RATE: 18 BRPM | HEIGHT: 61 IN | DIASTOLIC BLOOD PRESSURE: 70 MMHG | SYSTOLIC BLOOD PRESSURE: 128 MMHG | WEIGHT: 164 LBS | BODY MASS INDEX: 30.96 KG/M2 | OXYGEN SATURATION: 95 % | TEMPERATURE: 99.3 F | HEART RATE: 96 BPM

## 2023-12-29 DIAGNOSIS — U07.1 COVID: Primary | ICD-10-CM

## 2023-12-29 LAB
ALBUMIN SERPL BCP-MCNC: 4.6 G/DL (ref 3.4–5)
ALP SERPL-CCNC: 51 U/L (ref 33–110)
ALT SERPL W P-5'-P-CCNC: 15 U/L (ref 7–45)
ANION GAP SERPL CALC-SCNC: 13 MMOL/L (ref 10–20)
AST SERPL W P-5'-P-CCNC: 13 U/L (ref 9–39)
ATRIAL RATE: 109 BPM
BASOPHILS # BLD AUTO: 0.04 X10*3/UL (ref 0–0.1)
BASOPHILS NFR BLD AUTO: 0.5 %
BILIRUB SERPL-MCNC: 0.4 MG/DL (ref 0–1.2)
BUN SERPL-MCNC: 15 MG/DL (ref 6–23)
CALCIUM SERPL-MCNC: 9.8 MG/DL (ref 8.6–10.3)
CARDIAC TROPONIN I PNL SERPL HS: 3 NG/L (ref 0–13)
CHLORIDE SERPL-SCNC: 108 MMOL/L (ref 98–107)
CO2 SERPL-SCNC: 21 MMOL/L (ref 21–32)
CREAT SERPL-MCNC: 0.74 MG/DL (ref 0.5–1.05)
EOSINOPHIL # BLD AUTO: 0.4 X10*3/UL (ref 0–0.7)
EOSINOPHIL NFR BLD AUTO: 5.3 %
ERYTHROCYTE [DISTWIDTH] IN BLOOD BY AUTOMATED COUNT: 12.3 % (ref 11.5–14.5)
FLUAV RNA RESP QL NAA+PROBE: NOT DETECTED
FLUBV RNA RESP QL NAA+PROBE: NOT DETECTED
GFR SERPL CREATININE-BSD FRML MDRD: >90 ML/MIN/1.73M*2
GLUCOSE SERPL-MCNC: 111 MG/DL (ref 74–99)
HCT VFR BLD AUTO: 40.7 % (ref 36–46)
HGB BLD-MCNC: 13.2 G/DL (ref 12–16)
IMM GRANULOCYTES # BLD AUTO: 0.06 X10*3/UL (ref 0–0.7)
IMM GRANULOCYTES NFR BLD AUTO: 0.8 % (ref 0–0.9)
LACTATE SERPL-SCNC: 1.5 MMOL/L (ref 0.4–2)
LYMPHOCYTES # BLD AUTO: 0.98 X10*3/UL (ref 1.2–4.8)
LYMPHOCYTES NFR BLD AUTO: 13.1 %
MCH RBC QN AUTO: 30.4 PG (ref 26–34)
MCHC RBC AUTO-ENTMCNC: 32.4 G/DL (ref 32–36)
MCV RBC AUTO: 94 FL (ref 80–100)
MONOCYTES # BLD AUTO: 0.67 X10*3/UL (ref 0.1–1)
MONOCYTES NFR BLD AUTO: 8.9 %
NEUTROPHILS # BLD AUTO: 5.35 X10*3/UL (ref 1.2–7.7)
NEUTROPHILS NFR BLD AUTO: 71.4 %
NRBC BLD-RTO: 0 /100 WBCS (ref 0–0)
P AXIS: 28 DEGREES
P OFFSET: 165 MS
P ONSET: 117 MS
PLATELET # BLD AUTO: 301 X10*3/UL (ref 150–450)
POTASSIUM SERPL-SCNC: 3.7 MMOL/L (ref 3.5–5.3)
PR INTERVAL: 162 MS
PROT SERPL-MCNC: 7.7 G/DL (ref 6.4–8.2)
Q ONSET: 198 MS
QRS COUNT: 18 BEATS
QRS DURATION: 124 MS
QT INTERVAL: 362 MS
QTC CALCULATION(BAZETT): 487 MS
QTC FREDERICIA: 441 MS
R AXIS: -18 DEGREES
RBC # BLD AUTO: 4.34 X10*6/UL (ref 4–5.2)
SARS-COV-2 RNA RESP QL NAA+PROBE: DETECTED
SODIUM SERPL-SCNC: 138 MMOL/L (ref 136–145)
T AXIS: 4 DEGREES
T OFFSET: 379 MS
VENTRICULAR RATE: 109 BPM
WBC # BLD AUTO: 7.5 X10*3/UL (ref 4.4–11.3)

## 2023-12-29 PROCEDURE — 80053 COMPREHEN METABOLIC PANEL: CPT | Performed by: EMERGENCY MEDICINE

## 2023-12-29 PROCEDURE — 36415 COLL VENOUS BLD VENIPUNCTURE: CPT | Performed by: EMERGENCY MEDICINE

## 2023-12-29 PROCEDURE — 87040 BLOOD CULTURE FOR BACTERIA: CPT | Mod: SAMLAB | Performed by: EMERGENCY MEDICINE

## 2023-12-29 PROCEDURE — 87636 SARSCOV2 & INF A&B AMP PRB: CPT | Performed by: EMERGENCY MEDICINE

## 2023-12-29 PROCEDURE — 99284 EMERGENCY DEPT VISIT MOD MDM: CPT | Performed by: EMERGENCY MEDICINE

## 2023-12-29 PROCEDURE — 85025 COMPLETE CBC W/AUTO DIFF WBC: CPT | Performed by: EMERGENCY MEDICINE

## 2023-12-29 PROCEDURE — 87075 CULTR BACTERIA EXCEPT BLOOD: CPT | Mod: SAMLAB,59 | Performed by: EMERGENCY MEDICINE

## 2023-12-29 PROCEDURE — 93005 ELECTROCARDIOGRAM TRACING: CPT

## 2023-12-29 PROCEDURE — 71045 X-RAY EXAM CHEST 1 VIEW: CPT

## 2023-12-29 PROCEDURE — 84484 ASSAY OF TROPONIN QUANT: CPT | Performed by: EMERGENCY MEDICINE

## 2023-12-29 PROCEDURE — 71045 X-RAY EXAM CHEST 1 VIEW: CPT | Performed by: SURGERY

## 2023-12-29 PROCEDURE — 2500000004 HC RX 250 GENERAL PHARMACY W/ HCPCS (ALT 636 FOR OP/ED): Performed by: EMERGENCY MEDICINE

## 2023-12-29 PROCEDURE — 83605 ASSAY OF LACTIC ACID: CPT | Performed by: EMERGENCY MEDICINE

## 2023-12-29 PROCEDURE — 96365 THER/PROPH/DIAG IV INF INIT: CPT

## 2023-12-29 PROCEDURE — 96368 THER/DIAG CONCURRENT INF: CPT

## 2023-12-29 RX ORDER — CEFTRIAXONE 2 G/50ML
2 INJECTION, SOLUTION INTRAVENOUS ONCE
Status: COMPLETED | OUTPATIENT
Start: 2023-12-29 | End: 2023-12-29

## 2023-12-29 RX ORDER — BENZONATATE 100 MG/1
100 CAPSULE ORAL 3 TIMES DAILY PRN
Qty: 30 CAPSULE | Refills: 0 | Status: SHIPPED | OUTPATIENT
Start: 2023-12-29

## 2023-12-29 RX ORDER — ACETAMINOPHEN 325 MG/1
975 TABLET ORAL ONCE
Status: COMPLETED | OUTPATIENT
Start: 2023-12-29 | End: 2023-12-29

## 2023-12-29 RX ADMIN — CEFTRIAXONE SODIUM 2 G: 2 INJECTION, SOLUTION INTRAVENOUS at 05:02

## 2023-12-29 RX ADMIN — ACETAMINOPHEN 975 MG: 325 TABLET ORAL at 05:02

## 2023-12-29 RX ADMIN — SODIUM CHLORIDE 1000 ML: 9 INJECTION, SOLUTION INTRAVENOUS at 05:02

## 2023-12-29 RX ADMIN — AZITHROMYCIN MONOHYDRATE 500 MG: 500 INJECTION, POWDER, LYOPHILIZED, FOR SOLUTION INTRAVENOUS at 05:02

## 2023-12-29 ASSESSMENT — COLUMBIA-SUICIDE SEVERITY RATING SCALE - C-SSRS
2. HAVE YOU ACTUALLY HAD ANY THOUGHTS OF KILLING YOURSELF?: NO
1. IN THE PAST MONTH, HAVE YOU WISHED YOU WERE DEAD OR WISHED YOU COULD GO TO SLEEP AND NOT WAKE UP?: NO
6. HAVE YOU EVER DONE ANYTHING, STARTED TO DO ANYTHING, OR PREPARED TO DO ANYTHING TO END YOUR LIFE?: NO

## 2023-12-29 ASSESSMENT — PAIN SCALES - GENERAL: PAINLEVEL_OUTOF10: 0 - NO PAIN

## 2023-12-29 ASSESSMENT — PAIN - FUNCTIONAL ASSESSMENT: PAIN_FUNCTIONAL_ASSESSMENT: 0-10

## 2023-12-29 NOTE — ED PROVIDER NOTES
HPI   Chief Complaint   Patient presents with    Shortness of Breath     Pt c/o SOB since this AM, pt has ALS and asthma. Pt  states she has also had a dry cough.        Limitations to History: ALS    HPI: 55-year-old female presents with concern for difficulty breathing that began this morning.  Patient has a history of ALS and has a difficult time communicating.  States that she began with a dry cough and rhinorrhea approximately 2 to 3 days ago.  Denies any nausea, vomiting, abdominal pain, urinary symptoms.    Additional History Obtained from: None at the bedside.    ------------------------------------------------------------------------------------------------------------------------------------------  Physical Exam:    VS: As documented in the triage note and EMR flowsheet from this visit were reviewed.    Appearance: Alert. cooperative,  in no acute distress.   Skin: Intact,  dry skin, no lesions, rash, petechiae or purpura.   Eyes: PERRLA, EOMs intact,  Conjunctiva pink with no redness or exudates.   HENT: Normocephalic, atraumatic. Nares patent. No intraoral lesions.   Neck: Supple, without meningismus. Trachea at midline. No lymphadenopathy.  Pulmonary: Clear bilaterally with good chest wall excursion. No rales, rhonchi or wheezing. No accessory muscle use or stridor.  Cardiac: Tachycardic and regular rhythm, no rubs, murmurs, or gallops.   Abdomen: Abdomen is soft, nontender, and nondistended.  No palpable organomegaly.  No rebound or guarding.  No CVA tenderness. Nonsurgical abdomen  Genitourinary: Exam deferred.  Musculoskeletal: Full range of motion.  Pulses full and equal. No cyanosis, clubbing, or edema.  Psychiatric: Appropriate mood and affect.                            Deonte Coma Scale Score: 15                  Patient History   Past Medical History:   Diagnosis Date    ALS (amyotrophic lateral sclerosis) (CMS/[facility])     Asthma     Frozen shoulder      Past Surgical History:   Procedure  Laterality Date    GALLBLADDER SURGERY      HYSTERECTOMY      OTHER SURGICAL HISTORY  10/10/2019    Bilateral salpingectomy    OTHER SURGICAL HISTORY  10/10/2019    Turbinectomy    OTHER SURGICAL HISTORY  10/10/2019    Esophagogastroduodenoscopy    OTHER SURGICAL HISTORY  10/10/2019    Hysterectomy    OTHER SURGICAL HISTORY  10/10/2019    Cholecystectomy laparoscopic    OTHER SURGICAL HISTORY  10/10/2019    Tubal ligation    SINUS SURGERY       Family History   Problem Relation Name Age of Onset    Anemia Mother      Depression Mother      Ovarian cancer Mother      Cancer Mother          female genital organ    Heart attack Father          acute    Depression Father      Depression Sister      Heart attack Paternal Grandfather       Social History     Tobacco Use    Smoking status: Former     Types: Cigarettes    Smokeless tobacco: Never   Vaping Use    Vaping Use: Never used   Substance Use Topics    Alcohol use: Never    Drug use: Never       Physical Exam   ED Triage Vitals [12/29/23 0441]   Temp Heart Rate Resp BP   37.6 °C (99.7 °F) 107 18 156/84      SpO2 Temp Source Heart Rate Source Patient Position   93 % Temporal Monitor Sitting      BP Location FiO2 (%)     Left arm --       Physical Exam    ED Course & MDM   Diagnoses as of 12/29/23 0743   COVID       Medical Decision Making  Labs Reviewed  CBC WITH AUTO DIFFERENTIAL - Abnormal     WBC                           7.5                    nRBC                          0.0                    RBC                           4.34                   Hemoglobin                    13.2                   Hematocrit                    40.7                   MCV                           94                     MCH                           30.4                   MCHC                          32.4                   RDW                           12.3                   Platelets                     301                    Neutrophils %                 71.4                    Immature Granulocytes %, Automated   0.8                    Lymphocytes %                 13.1                   Monocytes %                   8.9                    Eosinophils %                 5.3                    Basophils %                   0.5                    Neutrophils Absolute          5.35                   Immature Granulocytes Absolute, Au*   0.06                   Lymphocytes Absolute          0.98 (*)               Monocytes Absolute            0.67                   Eosinophils Absolute          0.40                   Basophils Absolute            0.04                COMPREHENSIVE METABOLIC PANEL - Abnormal     Glucose                       111 (*)                Sodium                        138                    Potassium                     3.7                    Chloride                      108 (*)                Bicarbonate                   21                     Anion Gap                     13                     Urea Nitrogen                 15                     Creatinine                    0.74                   eGFR                          >90                    Calcium                       9.8                    Albumin                       4.6                    Alkaline Phosphatase          51                     Total Protein                 7.7                    AST                           13                     Bilirubin, Total              0.4                    ALT                           15                  SARS-COV-2 PCR, SYMPTOMATIC - Abnormal     Coronavirus 2019, PCR         Detected (*)                   Narrative: This assay has received FDA Emergency Use Authorization (EUA) and is only authorized for the duration of time that circumstances exist to justify the authorization of the emergency use of in vitro diagnostic tests for the detection of SARS-CoV-2 virus and/or diagnosis of COVID-19 infection under section 564(b)(1) of the Act, 21 U.S.C.  360bbb-3(b)(1). This assay is an in vitro diagnostic nucleic acid amplification test for the qualitative detection of SARS-CoV-2 from nasopharyngeal specimens and has been validated for use at [facility]. Negative results do not preclude COVID-19 infections and should not be used as the sole basis for diagnosis, treatment, or other management decisions.                    LACTATE - Normal     Lactate                       1.5                        Narrative: Venipuncture immediately after or during the administration of Metamizole may lead to falsely low results. Testing should be performed immediately                  prior to Metamizole dosing.  TROPONIN I, HIGH SENSITIVITY - Normal     Troponin I, High Sensitivity   3                          Narrative: Less than 99th percentile of normal range cutoff-                  Female and children under 18 years old <14 ng/L; Male <21 ng/L: Negative                  Repeat testing should be performed if clinically indicated.                                     Female and children under 18 years old 14-50 ng/L; Male 21-50 ng/L:                  Consistent with possible cardiac damage and possible increased clinical                   risk. Serial measurements may help to assess extent of myocardial damage.                                     >50 ng/L: Consistent with cardiac damage, increased clinical risk and                  myocardial infarction. Serial measurements may help assess extent of                   myocardial damage.                                      NOTE: Children less than 1 year old may have higher baseline troponin                   levels and results should be interpreted in conjunction with the overall                   clinical context.                                     NOTE: Troponin I testing is performed using a different                   testing methodology at [facility] than at other                    system hospitals. Direct result comparisons should only                   be made within the same method.  INFLUENZA A AND B PCR - Normal     Flu A Result                                         Flu B Result                                             Narrative: This assay is an in vitro diagnostic multiplex nucleic acid amplification test for the detection and discrimination of Influenza A & B from nasopharyngeal specimens, and has been validated for use at [facility]. Negative results do not preclude Influenza A/B infections, and should not be used as the sole basis for diagnosis, treatment, or other management decisions. If Influenza A/B and RSV PCR results are negative, testing for Parainfluenza virus, Adenovirus and Metapneumovirus is routinely performed for [facility] pediatric oncology and intensive care inpatients, and is available on other patients by placing an add-on request.  BLOOD CULTURE  BLOOD CULTURE  XR chest 1 view   Final Result    1. Lungs are hypoinflated with bronchovascular crowding. Patchy    bibasilar airspace opacities, worse on the left may relate to    atelectasis or pneumonia. No pleural effusion or pneumothorax.                      MACRO:    None          Signed by: Govind Conklin 12/29/2023 6:34 AM    Dictation workstation:   [id number]     Medical Decision Making:    Patient appears well nontoxic.  Tachycardic upon arrival.  Febrile.  Sepsis workup initiated.  Lactate negative.  Lab work otherwise unremarkable.  Chest x-ray shows pneumonia versus atelectasis.  COVID-positive.  Patient's heart rate improved after Tylenol and fluid resuscitation.  Patient is 95% on room air.  Discussed observation versus discharge and patient wishes to be discharged.  Did request something for cough and will be written for Ang Forman.  Advised on over-the-counter supportive care as well as continued p.o. hydration.  Asked to return for new or worsening symptoms.  Patient and son  agreeable and discharged home in stable condition.    Differential Diagnoses Considered: Pneumonia, sepsis, viral illness, COVID, influenza    Chronic Medical Conditions Significantly Affecting Care: ALS    Independent Interpretation of Studies:  I independently interpreted: Chest x-ray shows bilateral atelectasis versus infiltrates.    Escalation of Care:  Appropriate for discharge and follow-up with primary care.    Prescription Drug Consideration: Oral Tessalon Perles.          Procedure  ECG 12 lead    Performed by: Jerman Paredes DO  Authorized by: Jerman Paredes DO    ECG interpreted by ED Physician in the absence of a cardiologist: yes    Comments:      EKG interpreted by Dr. Jerman Paredes: Sinus tachycardia at a rate of 109 bpm.  AL interval of 162 ms.  QTc of 487 ms.  Right bundle branch block.  No evidence of ST elevation or depression at this time.       Jerman Paredes DO  12/29/23 0776

## 2024-01-02 LAB
BACTERIA BLD CULT: NORMAL
BACTERIA BLD CULT: NORMAL

## 2024-01-17 ENCOUNTER — APPOINTMENT (OUTPATIENT)
Dept: ORTHOPEDIC SURGERY | Facility: CLINIC | Age: 54
End: 2024-01-17
Payer: COMMERCIAL

## 2024-01-18 ENCOUNTER — APPOINTMENT (OUTPATIENT)
Dept: ORTHOPEDIC SURGERY | Facility: CLINIC | Age: 54
End: 2024-01-18
Payer: COMMERCIAL

## 2024-01-25 ENCOUNTER — TELEPHONE (OUTPATIENT)
Dept: PRIMARY CARE | Facility: CLINIC | Age: 54
End: 2024-01-25
Payer: COMMERCIAL

## 2024-01-25 DIAGNOSIS — M25.512 CHRONIC LEFT SHOULDER PAIN: ICD-10-CM

## 2024-01-25 DIAGNOSIS — G89.29 CHRONIC LEFT SHOULDER PAIN: ICD-10-CM

## 2024-01-25 DIAGNOSIS — G12.21 ALS (AMYOTROPHIC LATERAL SCLEROSIS) (MULTI): ICD-10-CM

## 2024-01-25 RX ORDER — TIZANIDINE 4 MG/1
4 TABLET ORAL EVERY 4 HOURS PRN
Qty: 90 TABLET | Refills: 0 | Status: SHIPPED | OUTPATIENT
Start: 2024-01-25 | End: 2024-07-23

## 2024-01-25 NOTE — TELEPHONE ENCOUNTER
[facility] has been out of muscle relaxers and they won’t fill it to early.  They have her taken 6 a day it’s why she ran out   Can you call in for more please     Refill proposed

## 2024-02-01 ENCOUNTER — TELEPHONE (OUTPATIENT)
Dept: PRIMARY CARE | Facility: CLINIC | Age: 54
End: 2024-02-01
Payer: COMMERCIAL

## 2024-02-01 DIAGNOSIS — G89.29 CHRONIC LEFT SHOULDER PAIN: Primary | ICD-10-CM

## 2024-02-01 DIAGNOSIS — M25.512 CHRONIC LEFT SHOULDER PAIN: Primary | ICD-10-CM

## 2024-02-01 RX ORDER — MELOXICAM 15 MG/1
15 TABLET ORAL DAILY
Qty: 90 TABLET | Refills: 1 | Status: SHIPPED | OUTPATIENT
Start: 2024-02-01 | End: 2024-07-30

## 2024-04-30 ENCOUNTER — TELEPHONE (OUTPATIENT)
Dept: PRIMARY CARE | Facility: CLINIC | Age: 54
End: 2024-04-30
Payer: COMMERCIAL

## 2024-04-30 DIAGNOSIS — G12.21 ALS (AMYOTROPHIC LATERAL SCLEROSIS) (MULTI): Primary | ICD-10-CM

## 2024-04-30 NOTE — PROGRESS NOTES
Unable to ambulate without assistance or rise from seated position d/t ALS dx. Will order Lift Chair.

## 2024-04-30 NOTE — TELEPHONE ENCOUNTER
I spoke to pt sister, pt doctor from [place] sent in an order earlier today for the lift chair - no further action is needed from our office

## 2024-04-30 NOTE — TELEPHONE ENCOUNTER
Tari Link (patient's sister) asking for an order for lift chair sent to [facility]. Patient has ALS. Asking for a call back [phone]